# Patient Record
Sex: FEMALE
[De-identification: names, ages, dates, MRNs, and addresses within clinical notes are randomized per-mention and may not be internally consistent; named-entity substitution may affect disease eponyms.]

---

## 2022-01-31 PROBLEM — Z00.00 ENCOUNTER FOR PREVENTIVE HEALTH EXAMINATION: Status: ACTIVE | Noted: 2022-01-31

## 2022-04-19 ENCOUNTER — APPOINTMENT (OUTPATIENT)
Age: 77
End: 2022-04-19
Payer: MEDICARE

## 2022-04-19 VITALS
HEART RATE: 70 BPM | RESPIRATION RATE: 15 BRPM | OXYGEN SATURATION: 98 % | SYSTOLIC BLOOD PRESSURE: 140 MMHG | DIASTOLIC BLOOD PRESSURE: 70 MMHG | HEIGHT: 63 IN | BODY MASS INDEX: 44.83 KG/M2 | TEMPERATURE: 97.1 F | WEIGHT: 253 LBS

## 2022-04-19 DIAGNOSIS — E11.9 TYPE 2 DIABETES MELLITUS W/OUT COMPLICATIONS: ICD-10-CM

## 2022-04-19 DIAGNOSIS — E66.01 MORBID (SEVERE) OBESITY DUE TO EXCESS CALORIES: ICD-10-CM

## 2022-04-19 PROCEDURE — 99203 OFFICE O/P NEW LOW 30 MIN: CPT | Mod: 95

## 2022-04-19 NOTE — REASON FOR VISIT
[Home] : at home, [unfilled] , at the time of the visit. [Medical Office: ([facility])___] : at the medical office located in  [Verbal consent obtained from patient] : the patient, [unfilled] [Initial Evaluation] : an initial evaluation

## 2022-04-20 ENCOUNTER — NON-APPOINTMENT (OUTPATIENT)
Age: 77
End: 2022-04-20

## 2022-04-20 NOTE — PHYSICAL EXAM
[General Appearance - Alert] : alert [General Appearance - In No Acute Distress] : in no acute distress [Sclera] : the sclera and conjunctiva were normal [PERRL With Normal Accommodation] : pupils were equal in size, round, and reactive to light [Extraocular Movements] : extraocular movements were intact [Examination Of The Oral Cavity] : the lips and gums were normal [Oropharynx] : the oropharynx was normal [Neck Cervical Mass (___cm)] : no neck mass was observed [Jugular Venous Distention Increased] : there was no jugular-venous distention [Respiration, Rhythm And Depth] : normal respiratory rhythm and effort [Exaggerated Use Of Accessory Muscles For Inspiration] : no accessory muscle use [Heart Rate And Rhythm] : heart rate was normal and rhythm regular [Edema] : there was no peripheral edema [Veins - Varicosity Changes] : there were no varicosital changes [Abdomen Soft] : soft [Abdomen Tenderness] : non-tender [Nail Clubbing] : no clubbing  or cyanosis of the fingernails [Involuntary Movements] : no involuntary movements were seen [] : no rash [Skin Lesions] : no skin lesions [No Focal Deficits] : no focal deficits [Oriented To Time, Place, And Person] : oriented to person, place, and time [Impaired Insight] : insight and judgment were intact [FreeTextEntry1] : Ambulates with rolling walker

## 2022-04-20 NOTE — ASSESSMENT
[FreeTextEntry1] : Patient is a 76yF referred for endoscopic sleeve gastroplasty\par \par WHO Class III obesity - With associated cardiac, pulmonary, endocrine, and connective tissue comorbidities. Patient would benefit from continued weight loss, however, no current faculty practicing endobariatrics.\par - Discussed referral to Endocrinology for diabetes management and medical weight loss therapy to which patient is amenable\par - Discussed referral to Nutritionist for education and guidance of dietary choices for obesity, diabetes, and reported renal disease\par - Obtain medication list, full medical history, and recent blood work from [facility] Dr. Papi Klein\par \par RTC as needed\par Patient seen and discussed with attending physician

## 2022-04-20 NOTE — HISTORY OF PRESENT ILLNESS
[de-identified] : Patient is a 76yF referred by Dr. Papi Klein of [facility] for endoscopic sleeve gastroplasty for weight loss. Patient has a history of WHO class III obesity in addition to asthma, diabetes, heart failure, valvular disease (how severe and which valve she is unsure). She suffers from back pain and osteoarthritis of the hips and knees. She has tried to lose weight via diet and exercise, going from ~325lbs to ~250lbs. Given her age and global condition, was referred for endobariatrics as opposed to bariatric surgery. Discussed with patient that unfortunately we do not have anyone in our current practice who performs this procedure, but do have colleagues throughout the city who can help if needed. She does not follow with an endocrinologist currently for her diabetes and reports her only diabetic medication is Metformin.